# Patient Record
Sex: FEMALE | Race: WHITE | NOT HISPANIC OR LATINO | URBAN - METROPOLITAN AREA
[De-identification: names, ages, dates, MRNs, and addresses within clinical notes are randomized per-mention and may not be internally consistent; named-entity substitution may affect disease eponyms.]

---

## 2017-08-30 ENCOUNTER — OFFICE VISIT - HEALTHEAST (OUTPATIENT)
Dept: ADDICTION MEDICINE | Facility: CLINIC | Age: 67
End: 2017-08-30

## 2017-08-30 DIAGNOSIS — F10.20 MODERATE ALCOHOL USE DISORDER (H): ICD-10-CM

## 2021-06-12 NOTE — PROGRESS NOTES
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 history and any significant history related to substance use--Arrests? Convictions? Circumstances? Alcohol or drug involvement? Sentences? Still on probation or parole? Expectations of the court? Current court order? Any sex offenses - lifetime? What level? (DSM)    None    10. What obstacles exist to participating in treatment? (Time off work, childcare, funding, transportation, pending assisted time, living situation)    None    Dimension VI Ratings   Recovery environment - The placing authority must use the criteria in Dimension VI to determine a client s recovery environment.   RISK DESCRIPTIONS - Severity rating: 3  Client is not engaged in structured, meaningful activity and the client's peers, family , significant other, and living environment are unsupportive, or there is significant criminal justice system involvement.    REASONS SEVERITY WAS ASSIGNED - (What support does the person have for making changes? What structure/stability does the person have in his or her daily life that willincrease the likelihood that changes can be sustained? What problems exist in the person s environment that will jeopardize getting/staying clean and sober?)     Client reports living alone and not being  engaged in rewarding, meaningful activity.  She does report sometimes attending AA and having contact with her sponsor despite resuming drinking.  She reports 2 of her children and other families members are supportive.  (Collateral info is that Client's children believe that Client drinks throughout her days at this time, is not engaged in meaningful activity and is not attending AA meetings, based on the phone calls and text messages they receive.)               Client Choice/Exceptions     Would you like services specific to language, age, gender, culture, Orthodox preference, race, ethnicity, sexual orientation or disability?  no    If yes, specify:      What particular treatment choices and options would you like to have?  Inpatient treatment at [facility].    Do you have a preference for a particular treatment program?  Inpatient treatment at [facility].        DSM-V Criteria for Substance Abuse  Instructions  Determine whether the client currently meets the criteria for a Substance Use Disorder using the diagnostic criteria in the DSM-V, pp. 481-589. Current means during the most recent 12 months outside a facility that controls access to substances.    Category of substance Severity ICD-10 Code/DSM V Code   [x]  Alcohol Use Disorder [] Mild  [x] Moderate  [] Severe [] (F10.10) (305.00)  [x] (F10.20) (303.90)  [] (F10.20) (303.90)   []  Cannabis Use Disorder [] Mild  [] Moderate  [] Severe [] (F12.10) (305.20)  [] (F12.20) (304.30)  [] (F12.20) (304.30)   [] Hallucinogen Use Disorder [] Mild  [] Moderate  [] Severe [] (F16.10) (305.30)  [] (F16.20) (304.50)  [] (F16.20) (304.50)   []  Inhalant Use Disorder [] Mild  [] Moderate  [] Severe [] (F18.10) (305.90)  [] (F18.20) (304.60)  [] (F18.20) (304.60)   []  Opioid Use Disorder [] Mild  [] Moderate  [] Severe [] (F11.10) (305.50)  [] (F11.20) (304.00)  [] (F11.20) (304.00)   []  Sedative, Hypnotic, or Anxiolytic Use Disorder [] Mild  [] Moderate  [] Severe []  (F13.10) (305.40)   [] (F13.20) (304.10)  [] (F13.20) (304.10)   []  Stimulant Related Disorders [] Mild  [] Moderate  [] Severe [] (F15.10) (305.70) Amphetamine type substance  [] (F14.10) (305.60) Cocaine  [] (F15.10) (305.70) Other or unspecified stimulant  [] (F15.20) (304.40) Amphetamine type substance  [] (F14.20) (304.20) Cocaine  [] (F15.20) (304.40) Other or unspecified stimulant  [] (F15.20) (304.40) Amphetamine type substance  [] (F14.20) (304.20) Cocaine  [] (F15.20) (304.40) Other or unspecified stimulant   []  Tobacco use Disorder [] Mild  [] Moderate  [] Severe [] (Z72.0) (305.1)  [] (F17.200) (305.1)  [] (F17.200) (305.1)   []  Other (or unknown) Substance Use Disorder [] Mild  [] Moderate  [] Severe [] (F19.10) (305.90)  [] (F19.20) (304.90)  [] (F19.20) (304.90)       Suggested Level of Care Necessary for Recovery  [x]  Inpatient  []  Extended Care []  Residential []  Outpatient  []  None            Collateral Contact Summary   Number of contacts made:  2  Contact with referring person:  Yes (children)     If court related records were reviewed, summarize here:  NA     []   Information from collateral contacts supported/largely agreed with information from the client and associated risk ratings.   [x]   Information from collateral contacts was significantly different from information from the client and lead to different risk ratings.      Summarize new information here:  Collateral information from Client's children includes their belief that Client is drinking more, and more often, than what Client reports.  Also, they are concerned about her physical health, safety, and mental health.      Rule 25 Assessment Summary and Plan   's Recommendation    Based on the information gathered in this assessment and from collateral information, the client meets criteria for Alcohol Use Disorder-Moderate (F10.20).  At this time she appears to be appropriate for and can benefit from Inpatient [facility]  "treatment.    This assessment can be updated with additional information within a 6 month period.      Collateral Contacts     Please duplicate this page for each contact.  If this includes information which is sensitive and not public, separate this page from the rest of the assessment before sharing.  Retain the page in the assessment file.   Name    Shakila Cruz Relationship    daughter Phone Number    [phone] Releases    Yes       Information Provided:      Concerned about physical safety, about relapse, about mental health.  Didn't call me when she fell on Monday - talked to her later.  Talk to her every day.  Her breathing is heavy.  Really gone downhill in last few months.  She lost 2 aunts and a cousin - some of those relationships were strained.  Very strained relationships w/one of my brothers.  Past things that happened that he just can't get over - she's letting it control her.  I'm seeing all of her past behaviors coming back w/a vengeance.    Main concern is her safety - now diabetic.  Her asthma is also a problem.      Collateral Contacts     Name    Sergio Dinh   Relationship    son Phone Number    [phone] Releases    yes       Information Provided:    There's a long hx of alcoholism in her and her family - her, her dad, her brother.  Treatment before, sober.  Then we noticed signs of doing it again.  Her mental state hasn't been good since dad's death (12 years ago).  My concerns are her mental state, the return of her alcoholism, her quality of life and how it is affecting all or our family.  About 3 weeks ago we had a phone \"intervention\", \"meeting\" with mom, my brother and sister, and her sponsor (who's also a family friend).  We gave her options - either treatment or else she will only have contact with her kids (not our spouses or her grandchildren). A day or two later, she called and said she would go to treatment.     Based on her texts and conversations, she might have suicidal " "thoughts.  (Nothing directly said)  Mom seems to have stepped up her drinking since out \"imeeting.\"  I know she was drinking Sunday (8/27).  Don't know for sure about Mon and Tues but she fell on Monday and I have to believe she'd been drinking.      Staff Name and Title:  Olena Liu,  [facility] [place]    Date:  8/30/2017  Time:  9:06 AM      "

## 2021-06-16 PROBLEM — F10.20 SEVERE ALCOHOL USE DISORDER (H): Status: ACTIVE | Noted: 2017-09-11

## 2021-06-16 PROBLEM — E11.9 TYPE 2 DIABETES MELLITUS (H): Status: ACTIVE | Noted: 2017-09-12

## 2021-06-16 PROBLEM — F33.1 MODERATE EPISODE OF RECURRENT MAJOR DEPRESSIVE DISORDER (H): Status: ACTIVE | Noted: 2017-09-12

## 2021-06-16 PROBLEM — G47.00 INSOMNIA, UNSPECIFIED: Status: ACTIVE | Noted: 2017-09-12
